# Patient Record
Sex: FEMALE | Race: WHITE | URBAN - METROPOLITAN AREA
[De-identification: names, ages, dates, MRNs, and addresses within clinical notes are randomized per-mention and may not be internally consistent; named-entity substitution may affect disease eponyms.]

---

## 2018-01-04 ENCOUNTER — OFFICE VISIT (OUTPATIENT)
Dept: URGENT CARE | Facility: URGENT CARE | Age: 23
End: 2018-01-04
Payer: COMMERCIAL

## 2018-01-04 VITALS
HEIGHT: 69 IN | OXYGEN SATURATION: 97 % | WEIGHT: 180 LBS | DIASTOLIC BLOOD PRESSURE: 71 MMHG | SYSTOLIC BLOOD PRESSURE: 124 MMHG | TEMPERATURE: 97.4 F | BODY MASS INDEX: 26.66 KG/M2 | HEART RATE: 71 BPM

## 2018-01-04 DIAGNOSIS — J06.9 VIRAL URI: Primary | ICD-10-CM

## 2018-01-04 PROCEDURE — 99213 OFFICE O/P EST LOW 20 MIN: CPT | Performed by: FAMILY MEDICINE

## 2018-01-04 NOTE — MR AVS SNAPSHOT
"              After Visit Summary   2018    Kalyani Garcia    MRN: [id number]           Patient Information     Date Of Birth          1995        Visit Information        Provider Department      2018 7:55 PM Manfred Loredo MD [facility]        Today's Diagnoses     Viral URI    -  1       Follow-ups after your visit        Who to contact     If you have questions or need follow up information about today's clinic visit or your schedule please contact [facility] directly at [phone].  Normal or non-critical lab and imaging results will be communicated to you by [employer], letter or phone within 4 business days after the clinic has received the results. If you do not hear from us within 7 days, please contact the clinic through [employer] or phone. If you have a critical or abnormal lab result, we will notify you by phone as soon as possible.  Submit refill requests through [employer] or call your pharmacy and they will forward the refill request to us. Please allow 3 business days for your refill to be completed.          Additional Information About Your Visit        MyChart Information     [employer] lets you send messages to your doctor, view your test results, renew your prescriptions, schedule appointments and more. To sign up, go to [email or web address] . Click on \"Log in\" on the left side of the screen, which will take you to the Welcome page. Then click on \"Sign up Now\" on the right side of the page.     You will be asked to enter the access code listed below, as well as some personal information. Please follow the directions to create your username and password.     Your access code is: [email or web address]  Expires: 2018  8:21 PM     Your access code will  in 90 days. If you need help or a new code, please call your [place] clinic or [phone].        Care EveryWhere ID     This is your Care EveryWhere ID. This could be used by other " "organizations to access your [facility] medical records  [id number]        Your Vitals Were     Pulse Temperature Height Last Period Pulse Oximetry Breastfeeding?    71 97.4  F (36.3  C) (Tympanic) 5' 8.5\" (1.74 m) 12/04/2017 97% No    BMI (Body Mass Index)                   26.97 kg/m2            Blood Pressure from Last 3 Encounters:   01/04/18 124/71   04/16/12 116/73   04/05/12 124/85    Weight from Last 3 Encounters:   01/04/18 180 lb (81.6 kg)   05/07/12 163 lb 8 oz (74.2 kg) (94 %)*   04/20/12 164 lb 8 oz (74.6 kg) (94 %)*     * Growth percentiles are based on [facility] 2-20 Years data.              Today, you had the following     No orders found for display       Primary Care Provider Office Phone # Fax #    Dona Sainz [phone]       [facility] [address] [place] [address]  [address] [address]        Equal Access to Services     [facility]: Hadii aad ku hadasho Soomaali, waaxda luqadaha, qaybta kaalmada adeegyada, waxwagner mckay . So [facility] [phone].    ATENCIÓN: Si habla español, tiene a hart disposición servicios gratuitos de asistencia lingüística. Llame al [phone].    We comply with applicable federal civil rights laws and [place] laws. We do not discriminate on the basis of race, color, national origin, age, disability, sex, sexual orientation, or gender identity.            Thank you!     Thank you for choosing [facility]  for your care. Our goal is always to provide you with excellent care. Hearing back from our patients is one way we can continue to improve our services. Please take a few minutes to complete the written survey that you may receive in the mail after your visit with us. Thank you!             Your Updated Medication List - Protect others around you: Learn how to safely use, store and throw away your medicines at www.disposemymeds.org.          This list is accurate as of: 1/4/18  8:21 PM.  " Always use your most recent med list.                   Brand Name Dispense Instructions for use Diagnosis    citalopram 10 MG tablet    celeXA    30 tablet    Take 1.5 tablets by mouth daily.    Moderate major depression (H), Generalized anxiety disorder       DULoxetine 20 MG EC capsule    CYMBALTA    90 capsule    Take 1 capsule by mouth daily.    Generalized anxiety disorder, Moderate major depression (H)       METFORMIN HCL PO           TRAZODONE HCL PO      Take 50 mg by mouth At Bedtime. 50-100mg at HS

## 2018-01-05 NOTE — PROGRESS NOTES
Subjective: 5 days ago patient got sick with typical cold symptoms, initially sore throat and headache and then some congestion and at times she feels like her chest is tight but not right now.  She does not have asthma as far as she knows that her father does.  Her main symptom is just extremely fatigued feeling.  She does not bring much up.  She had mono in the past.  She is a student.    Objective: NAD.  ENT is normal.  Neck is normal without nodes.  Lungs are clear.  Heart is regular without murmurs.  Abdomen benign.  No rash.    Assessment and plan: Viral URI, fatigue should start to get better as it runs its course.

## 2018-01-05 NOTE — NURSING NOTE
"Chief Complaint   Patient presents with     Urgent Care     Fatigue     c/o chest congestion,fatigue and SOB for 4 days       Initial /71  Pulse 71  Temp 97.4  F (36.3  C) (Tympanic)  Ht 5' 8.5\" (1.74 m)  Wt 180 lb (81.6 kg)  LMP 12/04/2017  SpO2 97%  Breastfeeding? No  BMI 26.97 kg/m2 Estimated body mass index is 26.97 kg/(m^2) as calculated from the following:    Height as of this encounter: 5' 8.5\" (1.74 m).    Weight as of this encounter: 180 lb (81.6 kg).  Medication Reconciliation: complete   Susan Dukes MA    "